# Patient Record
Sex: MALE | Race: WHITE | NOT HISPANIC OR LATINO | URBAN - METROPOLITAN AREA
[De-identification: names, ages, dates, MRNs, and addresses within clinical notes are randomized per-mention and may not be internally consistent; named-entity substitution may affect disease eponyms.]

---

## 2020-08-11 ENCOUNTER — IMPORTED ENCOUNTER (OUTPATIENT)
Dept: URBAN - METROPOLITAN AREA CLINIC 50 | Facility: CLINIC | Age: 60
End: 2020-08-11

## 2020-08-11 NOTE — PATIENT DISCUSSION
"""Shallow retinoschisis inf/temp OD.  Patient educated on signs/symptoms of RD and to rtc STAT if ""

## 2021-04-17 ASSESSMENT — VISUAL ACUITY
OD_CC: J1
OS_CC: 20/25-1
OS_CC: J1
OD_CC: 20/20

## 2021-04-17 ASSESSMENT — TONOMETRY
OS_IOP_MMHG: 13
OD_IOP_MMHG: 13

## 2023-07-07 ENCOUNTER — PREPPED CHART (OUTPATIENT)
Dept: URBAN - METROPOLITAN AREA CLINIC 53 | Facility: CLINIC | Age: 63
End: 2023-07-07

## 2023-08-14 ENCOUNTER — NEW PATIENT (OUTPATIENT)
Dept: URBAN - METROPOLITAN AREA CLINIC 53 | Facility: CLINIC | Age: 63
End: 2023-08-14

## 2023-08-14 DIAGNOSIS — H02.831: ICD-10-CM

## 2023-08-14 DIAGNOSIS — H52.4: ICD-10-CM

## 2023-08-14 DIAGNOSIS — H02.834: ICD-10-CM

## 2023-08-14 DIAGNOSIS — H25.13: ICD-10-CM

## 2023-08-14 DIAGNOSIS — H52.03: ICD-10-CM

## 2023-08-14 DIAGNOSIS — D31.32: ICD-10-CM

## 2023-08-14 DIAGNOSIS — H40.013: ICD-10-CM

## 2023-08-14 PROCEDURE — 92004 COMPRE OPH EXAM NEW PT 1/>: CPT

## 2023-08-14 PROCEDURE — 92015 DETERMINE REFRACTIVE STATE: CPT

## 2023-08-14 ASSESSMENT — KERATOMETRY
OS_K2POWER_DIOPTERS: 44.00
OS_AXISANGLE2_DEGREES: 88
OD_AXISANGLE2_DEGREES: 019
OD_AXISANGLE_DEGREES: 109
OS_K1POWER_DIOPTERS: 43.50
OD_K1POWER_DIOPTERS: 43.25
OD_K2POWER_DIOPTERS: 43.50
OS_AXISANGLE_DEGREES: 178

## 2023-08-14 ASSESSMENT — VISUAL ACUITY
OD_CC: J1
OD_CC: 20/30
OS_CC: J1
OS_CC: 20/20
OS_SC: 20/150
OD_SC: 20/150
OU_SC: 20/150
OD_SC: 20/400
OU_CC: J1+
OS_SC: 20/400

## 2023-08-14 ASSESSMENT — TONOMETRY
OS_IOP_MMHG: 15
OD_IOP_MMHG: 16

## 2023-10-23 ENCOUNTER — DIAGNOSTICS ONLY (OUTPATIENT)
Dept: URBAN - METROPOLITAN AREA CLINIC 53 | Facility: CLINIC | Age: 63
End: 2023-10-23

## 2023-10-23 DIAGNOSIS — H40.013: ICD-10-CM

## 2023-10-23 PROCEDURE — 92133 CPTRZD OPH DX IMG PST SGM ON: CPT

## 2023-10-23 PROCEDURE — 92083 EXTENDED VISUAL FIELD XM: CPT

## 2023-10-23 ASSESSMENT — KERATOMETRY
OS_AXISANGLE2_DEGREES: 88
OS_K2POWER_DIOPTERS: 44.00
OD_K1POWER_DIOPTERS: 43.25
OD_AXISANGLE_DEGREES: 109
OD_K2POWER_DIOPTERS: 43.50
OD_AXISANGLE2_DEGREES: 019
OS_K1POWER_DIOPTERS: 43.50
OS_AXISANGLE_DEGREES: 178